# Patient Record
(demographics unavailable — no encounter records)

---

## 2025-04-16 NOTE — PHYSICAL EXAM
[Normal Breath Sounds] : Normal breath sounds [Normal Rate and Rhythm] : normal rate and rhythm [2+] : left 2+ [Ankle Swelling (On Exam)] : present [Ankle Swelling Bilaterally] : bilaterally  [] : bilaterally [No Rash or Lesion] : No rash or lesion [Varicose Veins Of Lower Extremities] : not present [de-identified] : alert and oriented in NAD

## 2025-04-16 NOTE — PLAN
[TextEntry] : 73yo F with bilateral provoked DVT found during hospitalization for PNA in March.  Imaging today shows post thrombotic changes in left soleal vein.  C/w anticoagulation for 3 months.  She will rtc in 2 months with bilateral VDX.   compression stockings and leg elevation.

## 2025-04-16 NOTE — HISTORY OF PRESENT ILLNESS
[FreeTextEntry1] : 75yo F with hx of asthma, COPD, GERD, HTN, HLD, obestiy, OA s/p bilateral TKR who presents for vascular consult for bilateral LE DVT (posterior tibial, peroneal, gastroc, soleal) found during 2-week hospitalization for pna.  She is now on ac with Eliquis x 2 months.  She has long standing bilateral lower ext edema with mild hyperpigmentation.  Denies prior hx of DVT or known venous insufficiency.

## 2025-05-14 NOTE — CONSULT LETTER
[Dear  ___] : Dear  [unfilled], [Consult Letter:] : I had the pleasure of evaluating your patient, [unfilled]. [Please see my note below.] : Please see my note below. [Consult Closing:] : Thank you very much for allowing me to participate in the care of this patient.  If you have any questions, please do not hesitate to contact me. [Sincerely,] : Sincerely, [FreeTextEntry2] : Dr. Abhay Ortiz MD Fax: [phone] [FreeTextEntry3] : Jeyson Steve MD Attending Physician in Pulmonary & Critical Care Medicine Associate Professor of Medicine [employer] at [facility]

## 2025-05-14 NOTE — ASSESSMENT
[FreeTextEntry1] : -  Ms. Call is a 74 year-old female with a history of asthma, HTN, HLD, obesity, GERD, and OA s/p bilateral TKA who presents for evaluation after recent hospitalization at [facility] in Feb 2025 for acute hypoxemic respiratory failure and septic shock due to multifocal pneumonia requiring Bilevel PAP --> high flow nasal cannula, pressors (norepinephrine), IVF, antibiotics (vancomycin + cefepime), albuterol-ipratropium nebs, and methylprednisolone. Admitted to MICU. Treated for pneumonia + Asthma exacerbation. CXR showed bilateral opacities concerning for pneumonia vs. CHF. Echo with hyperdynamic LV systolic function, no significant valvular disease. Infectious workup unrevealing, including RVP, urine Strep, urine Legionella, nasal MSSA/MRSA, blood cultures (Staph epi on initial cultures likely contaminant), and sputum culture. Antibiotics were de-escalated to ceftriaxone. Supplemental oxygen was weaned to NC, now off oxygen. Course complicated by leg swelling due to DVT in left gastrocnemius vein. CTPA with no PE. She was monitored off a/c during hospitalization, but then with evidence of below-the-knee propagation while at rehab after hospitalization, now on apixaban. Hospital course also complicated by black tarry stools likely due to UGI bleed in the setting of NSAID use. Hemoglobin remained stable, although she required 2 units prbc's while at rehab. Respiratory status is significantly improved since discharge. Remains on room air. No dyspnea, cough, wheezing, chest tightness, or nocturnal symptoms. She is adherent with Breo Ellipta, montelukast, fexofenadine, and fluticasone nasal spray. Has not required albuterol since discharge.   LE DUPLEX (April 2025) with no evidence of DVT. There are post-thrombotic non-occlusive changes in the left soleal vein with minimal flow.  CTPA (Feb 2025) with no PE, diffuse bilateral opacities consistent with multifocal pneumonia vs. pulmonary edema.  2D-ECHO (Feb 2025) with hyperdynamic LV systolic. Mild LV diastolic dysfunction. Normal RV size and systolic function. Normal LA/RA. Mild to moderate TR. Estimated PASP is 39 mm Hg consistent with mild pulmonary hypertension. No significant pericardial effusion. IVC 0.5 cm with normal inspiratory collapse.  ASSESSMENT:  History of multifocal pneumonia (Feb 2025) Moderate persistent asthma Below the knee DVT on apixaban  PLAN:  - Recommend repeat CT chest to evaluate for resolution of multifocal pneumonia - Check complete PFTs with bronchodilator testing  - Check FeNO - Continue Breo Ellipta 200-25 mcg 1 inhalation daily, rinse mouth after use - Montelukast 10 mg at bedtime, renewal sent to pharmacy - Albuterol inhaler PRN wheezing, shortness of breath, chest tightness, or cough - Restart fluticasone nasal spray 1-2 sprays per nostril twice daily  - Continue apixaban for LE DVT with below-the-knee propagation - Check CBC with diff, PT gene mutation, Factor V Leiden, Antithrombin III, and APLS labs - Follow-up in office in September with PFTs and FeNO

## 2025-05-14 NOTE — HISTORY OF PRESENT ILLNESS
[Never] : never [TextBox_4] : Ms. Call is a 74 year-old female with a history of asthma, HTN, HLD, obesity, GERD, OA s/p bilateral TKA who presents for evaluation after recent hospitalization at [facility] for acute hypoxemic respiratory failure and septic shock due to multifocal pneumonia requiring Bilevel PAP --> high flow nasal cannula, pressors (norepinephrine), IVF, antibiotics (vancomycin + cefepime), albuterol-ipratropium nebs, and methylprednisolone. Noted to have WBC 30. Admitted to MICU. Treated for pneumonia + asthma exacerbation. CXR showed bilateral opacities concerning for pneumonia vs. CHF. Echo with hyperdynamic LV systolic function, no significant valvular disease. Infectious workup unrevealing, including RVP, urine Strep, urine Legionella, nasal MSSA/MRSA, blood cultures (Staph epi on initial cultures likely contaminant), and sputum culture. Her antibiotics were de-escalated to ceftriaxone. Her supplemental oxygen was weaned to NC. Course complicated by leg swelling due to DVT in left gastrocnemius vein. CTPA with no PE. She was monitored off a/c. Course also complicated by black tarry stools likely due to UGI bleed in the setting of NSAID use. Hemoglobin remained stable. Serial duplex without propagation of DVT.  She was discharged to [facility] and now presents for pulmonary evaluation. Since discharge a repeat Duplex on 3/17/25 with interval progression of calf vein DVT and she is now on apixaban. States since discharge from rehab her breathing is stable. Endorses compliance with Breo Ellipta 200-25 mcg once daily, Montelukast nightly, has not required albuterol at all since discharge. States since discharge her breathing is doing well, denies any shortness of breath, wheezing or chest tightness. States prior to admission she noted shortness of breath on exertion with associated wheezing with activity requiring frequent use of Albuterol inhaler. Since discharge her breathing is much improved, increased activity tolerance, previously unable to walk more than half a block without shortness of breath and wheezing requiring albuterol use, she now can walk several blocks without any limitation.   LE DUPLEX (April 2025) with no evidence of DVT. There are post-thrombotic non-occlusive changes in the left soleal vein with minimal flow.  CTPA (Feb 2025) with no PE, diffuse bilateral opacities consistent with multifocal pneumonia vs. pulmonary edema.  2D-ECHO (Feb 2025) with hyperdynamic LV systolic. Mild LV diastolic dysfunction. Normal RV size and systolic function. Normal LA/RA. Mild to moderate TR. Estimated PASP is 39 mm Hg consistent with mild pulmonary hypertension. No significant pericardial effusion. IVC 0.5 cm with normal inspiratory collapse.

## 2025-05-14 NOTE — PHYSICAL EXAM
[No Acute Distress] : no acute distress [Normal Oropharynx] : normal oropharynx [Normal Appearance] : normal appearance [No Neck Mass] : no neck mass [Normal Rate/Rhythm] : normal rate/rhythm [Normal S1, S2] : normal s1, s2 [No Murmurs] : no murmurs [No Resp Distress] : no resp distress [Clear to Auscultation Bilaterally] : clear to auscultation bilaterally [No Abnormalities] : no abnormalities [Benign] : benign [Normal Gait] : normal gait [No Clubbing] : no clubbing [No Cyanosis] : no cyanosis [FROM] : FROM [1+ Pitting] : 1+ pitting [Normal Color/ Pigmentation] : normal color/ pigmentation [No Focal Deficits] : no focal deficits [Oriented x3] : oriented x3 [Normal Affect] : normal affect [Well Nourished] : well nourished [Well Developed] : well developed [Supple] : supple [No JVD] : no jvd [No Acc Muscle Use] : no acc muscle use [TextBox_68] : no wheezing or rales

## 2025-05-14 NOTE — CONSULT LETTER
[Dear  ___] : Dear  [unfilled], [Consult Letter:] : I had the pleasure of evaluating your patient, [unfilled]. [Please see my note below.] : Please see my note below. [Consult Closing:] : Thank you very much for allowing me to participate in the care of this patient.  If you have any questions, please do not hesitate to contact me. [Sincerely,] : Sincerely, [FreeTextEntry2] : Dr. Abhay Ortiz MD Fax: [phone] [FreeTextEntry3] : Jeyson Steve MD Attending Physician in Pulmonary & Critical Care Medicine Associate Professor of Medicine [employer] at [facility]

## 2025-05-14 NOTE — ASSESSMENT
[FreeTextEntry1] : -  Ms. Call is a 74 year-old female with a history of asthma, HTN, HLD, obesity, GERD, and OA s/p bilateral TKA who presents for evaluation after recent hospitalization at [facility] in Feb 2025 for acute hypoxemic respiratory failure and septic shock due to multifocal pneumonia requiring Bilevel PAP --> high flow nasal cannula, pressors (norepinephrine), IVF, antibiotics (vancomycin + cefepime), albuterol-ipratropium nebs, and methylprednisolone. Admitted to MICU. Treated for pneumonia + Asthma exacerbation. CXR showed bilateral opacities concerning for pneumonia vs. CHF. Echo with hyperdynamic LV systolic function, no significant valvular disease. Infectious workup unrevealing, including RVP, urine Strep, urine Legionella, nasal MSSA/MRSA, blood cultures (Staph epi on initial cultures likely contaminant), and sputum culture. Antibiotics were de-escalated to ceftriaxone. Supplemental oxygen was weaned to NC, now off oxygen. Course complicated by leg swelling due to DVT in left gastrocnemius vein. CTPA with no PE. She was monitored off a/c during hospitalization, but then with evidence of below-the-knee propagation while at rehab after hospitalization, now on apixaban. Hospital course also complicated by black tarry stools likely due to UGI bleed in the setting of NSAID use. Hemoglobin remained stable, although she required 2 units prbc's while at rehab. Respiratory status is significantly improved since discharge. Remains on room air. No dyspnea, cough, wheezing, chest tightness, or nocturnal symptoms. She is adherent with Breo Ellipta, montelukast, fexofenadine, and fluticasone nasal spray. Has not required albuterol since discharge.   LE DUPLEX (April 2025) with no evidence of DVT. There are post-thrombotic non-occlusive changes in the left soleal vein with minimal flow.  CTPA (Feb 2025) with no PE, diffuse bilateral opacities consistent with multifocal pneumonia vs. pulmonary edema.  2D-ECHO (Feb 2025) with hyperdynamic LV systolic. Mild LV diastolic dysfunction. Normal RV size and systolic function. Normal LA/RA. Mild to moderate TR. Estimated PASP is 39 mm Hg consistent with mild pulmonary hypertension. No significant pericardial effusion. IVC 0.5 cm with normal inspiratory collapse.  ASSESSMENT:  History of multifocal pneumonia (Feb 2025) Moderate persistent asthma Below the knee DVT on apixaban  PLAN:  - Recommend repeat CT chest to evaluate for resolution of multifocal pneumonia - Check complete PFTs with bronchodilator testing  - Check FeNO - Continue Breo Ellipta 200-25 mcg 1 inhalation daily, rinse mouth after use - Montelukast 10 mg at bedtime, renewal sent to pharmacy - Albuterol inhaler PRN wheezing, shortness of breath, chest tightness, or cough - Restart fluticasone nasal spray 1-2 sprays per nostril twice daily  - Continue apixaban for LE DVT with below-the-knee propagation - Check CBC with diff, PT gene mutation, Factor V Leiden, Antithrombin III, and APLS labs - Follow-up in office in September with PFTs and FeNO

## 2025-05-14 NOTE — HISTORY OF PRESENT ILLNESS
[Never] : never [TextBox_4] : Ms. Call is a 74 year-old female with a history of asthma, HTN, HLD, obesity, GERD, OA s/p bilateral TKA who presents for evaluation after recent hospitalization at [facility] for acute hypoxemic respiratory failure and septic shock due to multifocal pneumonia requiring Bilevel PAP --> high flow nasal cannula, pressors (norepinephrine), IVF, antibiotics (vancomycin + cefepime), albuterol-ipratropium nebs, and methylprednisolone. Noted to have WBC 30. Admitted to MICU. Treated for pneumonia + asthma exacerbation. CXR showed bilateral opacities concerning for pneumonia vs. CHF. Echo with hyperdynamic LV systolic function, no significant valvular disease. Infectious workup unrevealing, including RVP, urine Strep, urine Legionella, nasal MSSA/MRSA, blood cultures (Staph epi on initial cultures likely contaminant), and sputum culture. Her antibiotics were de-escalated to ceftriaxone. Her supplemental oxygen was weaned to NC. Course complicated by leg swelling due to DVT in left gastrocnemius vein. CTPA with no PE. She was monitored off a/c. Course also complicated by black tarry stools likely due to UGI bleed in the setting of NSAID use. Hemoglobin remained stable. Serial duplex without propagation of DVT.  She was discharged to [facility] and now presents for pulmonary evaluation. Since discharge a repeat Duplex on 3/17/25 with interval progression of calf vein DVT and she is now on apixaban. States since discharge from rehab her breathing is stable. Endorses compliance with Breo Ellipta 200-25 mcg once daily, Montelukast nightly, has not required albuterol at all since discharge. States since discharge her breathing is doing well, denies any shortness of breath, wheezing or chest tightness. States prior to admission she noted shortness of breath on exertion with associated wheezing with activity requiring frequent use of Albuterol inhaler. Since discharge her breathing is much improved, increased activity tolerance, previously unable to walk more than half a block without shortness of breath and wheezing requiring albuterol use, she now can walk several blocks without any limitation.   LE DUPLEX (April 2025) with no evidence of DVT. There are post-thrombotic non-occlusive changes in the left soleal vein with minimal flow.  CTPA (Feb 2025) with no PE, diffuse bilateral opacities consistent with multifocal pneumonia vs. pulmonary edema.  2D-ECHO (Feb 2025) with hyperdynamic LV systolic. Mild LV diastolic dysfunction. Normal RV size and systolic function. Normal LA/RA. Mild to moderate TR. Estimated PASP is 39 mm Hg consistent with mild pulmonary hypertension. No significant pericardial effusion. IVC 0.5 cm with normal inspiratory collapse.

## 2025-06-26 NOTE — PHYSICAL EXAM
[2+] : left 2+ [de-identified] : Alert/oriented NAD  [FreeTextEntry1] : LE warm, well perfused  brisk cap refill < 3 seconds throughout  motor/sensory intact  no new edema   Duplex today:  no evidence of acute or chronic DVT or deep venous insufficiency.

## 2025-06-26 NOTE — HISTORY OF PRESENT ILLNESS
[FreeTextEntry1] : 75yo F with hx of asthma, COPD, GERD, HTN, HLD, obesity, OA s/p bilateral TKR who presents for vascular consult for bilateral LE DVT (posterior tibial, peroneal, gastroc, soleal) found during 2-week hospitalization for pna. She is now on ac with Eliquis x 2 months. She has long standing bilateral lower ext edema with mild hyperpigmentation. Denies prior hx of DVT or known venous insufficiency.  Last duplex revealed post thrombotic changes in the left soleal vein.  Follow up duplex scheduled today.

## 2025-06-26 NOTE — PHYSICAL EXAM
[2+] : left 2+ [de-identified] : Alert/oriented NAD  [FreeTextEntry1] : LE warm, well perfused  brisk cap refill < 3 seconds throughout  motor/sensory intact  no new edema   Duplex today:  no evidence of acute or chronic DVT or deep venous insufficiency.

## 2025-06-26 NOTE — PHYSICAL EXAM
[2+] : left 2+ [de-identified] : Alert/oriented NAD  [FreeTextEntry1] : LE warm, well perfused  brisk cap refill < 3 seconds throughout  motor/sensory intact  no new edema   Duplex today:  no evidence of acute or chronic DVT or deep venous insufficiency.

## 2025-06-26 NOTE — HISTORY OF PRESENT ILLNESS
[FreeTextEntry1] : 73yo F with hx of asthma, COPD, GERD, HTN, HLD, obesity, OA s/p bilateral TKR who presents for vascular consult for bilateral LE DVT (posterior tibial, peroneal, gastroc, soleal) found during 2-week hospitalization for pna. She is now on ac with Eliquis x 2 months. She has long standing bilateral lower ext edema with mild hyperpigmentation. Denies prior hx of DVT or known venous insufficiency.  Last duplex revealed post thrombotic changes in the left soleal vein.  Follow up duplex scheduled today.